# Patient Record
Sex: MALE | Race: WHITE
[De-identification: names, ages, dates, MRNs, and addresses within clinical notes are randomized per-mention and may not be internally consistent; named-entity substitution may affect disease eponyms.]

---

## 2018-05-29 ENCOUNTER — HOSPITAL ENCOUNTER (OUTPATIENT)
Dept: HOSPITAL 61 - CT | Age: 68
Discharge: HOME | End: 2018-05-29
Attending: UROLOGY
Payer: MEDICARE

## 2018-05-29 DIAGNOSIS — R59.0: ICD-10-CM

## 2018-05-29 DIAGNOSIS — M51.36: ICD-10-CM

## 2018-05-29 DIAGNOSIS — M48.061: ICD-10-CM

## 2018-05-29 DIAGNOSIS — D35.02: ICD-10-CM

## 2018-05-29 DIAGNOSIS — K43.9: ICD-10-CM

## 2018-05-29 DIAGNOSIS — N13.39: ICD-10-CM

## 2018-05-29 DIAGNOSIS — N28.1: Primary | ICD-10-CM

## 2018-05-29 DIAGNOSIS — K57.30: ICD-10-CM

## 2018-05-29 PROCEDURE — 74178 CT ABD&PLV WO CNTR FLWD CNTR: CPT

## 2018-05-29 RX ADMIN — IOHEXOL 1 ML: 300 INJECTION, SOLUTION INTRAVENOUS at 11:51

## 2018-06-19 ENCOUNTER — HOSPITAL ENCOUNTER (EMERGENCY)
Dept: HOSPITAL 63 - ER | Age: 68
Discharge: HOME | End: 2018-06-19
Payer: MEDICARE

## 2018-06-19 VITALS — DIASTOLIC BLOOD PRESSURE: 75 MMHG | SYSTOLIC BLOOD PRESSURE: 128 MMHG

## 2018-06-19 VITALS — BODY MASS INDEX: 25.69 KG/M2 | HEIGHT: 76 IN | WEIGHT: 211 LBS

## 2018-06-19 DIAGNOSIS — R31.0: ICD-10-CM

## 2018-06-19 DIAGNOSIS — M19.90: ICD-10-CM

## 2018-06-19 DIAGNOSIS — N39.0: Primary | ICD-10-CM

## 2018-06-19 DIAGNOSIS — Z90.49: ICD-10-CM

## 2018-06-19 DIAGNOSIS — E11.9: ICD-10-CM

## 2018-06-19 LAB
APTT PPP: (no result) S
BACTERIA #/AREA URNS HPF: (no result) /HPF
BILIRUB UR QL STRIP: (no result)
FIBRINOGEN PPP-MCNC: (no result) MG/DL
GLUCOSE UR STRIP-MCNC: (no result) MG/DL
NITRITE UR QL STRIP: (no result)
RBC #/AREA URNS HPF: >40 /HPF (ref 0–2)
SP GR UR STRIP: 1.02
SQUAMOUS #/AREA URNS LPF: (no result) /LPF
UROBILINOGEN UR-MCNC: 0.2 MG/DL
WBC #/AREA URNS HPF: (no result) /HPF (ref 0–4)

## 2018-06-19 PROCEDURE — 96372 THER/PROPH/DIAG INJ SC/IM: CPT

## 2018-06-19 PROCEDURE — 81001 URINALYSIS AUTO W/SCOPE: CPT

## 2018-06-19 PROCEDURE — 99285 EMERGENCY DEPT VISIT HI MDM: CPT

## 2018-06-19 NOTE — PHYS DOC
Past History


Past Medical History:  Arthritis, Cancer, Diabetes, Other


Past Surgical History:  Appendectomy, Cholecystectomy, Other


Alcohol Use:  None


Drug Use:  None





Adult General


Chief Complaint


Chief Complaint:  URINARY RETENTION





HPI


HPI





Patient is a 68-year-old male who presents for evaluation of hematuria and 

suprapubic pain. He states that he had a procedure performed yesterday by Dr. Mendenhall, a urologist, at [facility]. He had been having some gross 

hematuria so biopsies were taken of his bladder and a tumor was removed which 

is thought to be malignant but results are pending. He is able to urinate but 

has pain when doing so and has gross hematuria. He took some of his home 

opiates before coming in. He does not feel like he is retaining urine at this 

time.





Review of Systems


Review of Systems





Constitutional: Denies fever or chills []


Eyes: Denies change in visual acuity, redness, or eye pain []


HENT: Denies nasal congestion or sore throat []


Respiratory: Denies cough or shortness of breath []


Cardiovascular: No additional information not addressed in HPI []


GI: Denies abdominal pain, nausea, vomiting, bloody stools or diarrhea []


: +dysuria and hematuria


Musculoskeletal: Denies back pain or joint pain []


Integument: Denies rash or skin lesions []


Neurologic: Denies headache, focal weakness or sensory changes []


Endocrine: Denies polyuria or polydipsia []





All other systems were reviewed and found to be within normal limits, except as 

documented in this note.





Allergies


Allergies





Allergies








Coded Allergies Type Severity Reaction Last Updated Verified


 


  No Known Drug Allergies    5/13/16 No











Physical Exam


Physical Exam





Constitutional: Well developed, well nourished, no acute distress, non-toxic 

appearance. []


HENT: Normocephalic, atraumatic, bilateral external ears normal, oropharynx 

moist, no oral exudates, nose normal. []


Eyes: PERRLA, EOMI, conjunctiva normal, no discharge. [] 


Neck: Normal range of motion, no tenderness, supple, no stridor. [] 


Cardiovascular:Heart rate regular rhythm, no murmur []


Lungs & Thorax:  Bilateral breath sounds clear to auscultation []


Abdomen: Bowel sounds normal, soft, no masses, no pulsatile masses. [] Gaxiola in 

place, +gross hematuria, mild suprapubic discomfort


Skin: Warm, dry, no erythema, no rash. [] 


Back: No tenderness, no CVA tenderness. [] 


Extremities: No tenderness, no cyanosis, no clubbing, ROM intact, no edema. [] 


Neurologic: Alert and oriented X 3, normal motor function, normal sensory 

function, no focal deficits noted. []


Psychologic: Affect normal, judgement normal, mood normal. []





EKG


EKG


[]





Radiology/Procedures


Radiology/Procedures


[]





Course & Med Decision Making


Course & Med Decision Making


Pertinent Labs and Imaging studies reviewed. (See chart for details)





@1100 - Case discussed with Dr. Mendenhall, the patient's urologist. He states that 

the patient was having clear urine after the procedure and was sent home. He 

states that as long as he is not having clots causing obstruction he is okay 

with the patient going home. He states the pain is likely from a combination of 

the catheter and the biopsy sites and that he would prefer to keep the catheter 

in place at this time if the patient is able to tolerate it. He states the 

patient is going to see him in clinic on this Thursda (48 hrs). Will wait for 

UA results.





@1115 - UA shows mild UTI so will treat with Levaquin here and to go home with. 

Encourage patient to keep his appointment with his urologist on Thursday.





Dragon Disclaimer


Dragon Disclaimer


This electronic medical record was generated, in whole or in part, using a 

voice recognition dictation system.





Departure


Departure:


Impression:  


 Primary Impression:  


 Gross hematuria


 Additional Impressions:  


 Gaxiola catheter in place


 UTI (urinary tract infection)


Disposition:  01 HOME, SELF-CARE


Condition:  STABLE


Referrals:  


WEN HOLLOWAY MD (PCP)








NEGRA MENDENHALL MD


Patient Instructions:  Gaxiola Catheter Care, Adult, Hematuria, Adult, Urinary 

Tract Infection





Additional Instructions:  


Keep yourr appointment with Dr. Mendenhall on Thursday. Return to the emergency 

department for new or worsening symptoms. Take the antibiotic as prescribed. 

Drink plenty of water at home to avoid dehydration.


Scripts


Levofloxacin (LEVAQUIN) 750 Mg Tablet


1 TAB PO DAILY, #5 TAB


   Prov: TREVOR CROFT DO         6/19/18





Problem Qualifiers











TREVOR CROFT DO Jun 19, 2018 10:12

## 2019-01-21 ENCOUNTER — HOSPITAL ENCOUNTER (EMERGENCY)
Dept: HOSPITAL 63 - ER | Age: 69
Discharge: ANOTHER HOSPITAL | End: 2019-01-21
Payer: MEDICARE

## 2019-01-21 VITALS — BODY MASS INDEX: 26.04 KG/M2 | WEIGHT: 213.85 LBS | HEIGHT: 76 IN

## 2019-01-21 VITALS — SYSTOLIC BLOOD PRESSURE: 124 MMHG | DIASTOLIC BLOOD PRESSURE: 70 MMHG

## 2019-01-21 DIAGNOSIS — G89.29: Primary | ICD-10-CM

## 2019-01-21 DIAGNOSIS — Z85.51: ICD-10-CM

## 2019-01-21 DIAGNOSIS — Z85.05: ICD-10-CM
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
## 2019-01-21 DIAGNOSIS — Z85.46: ICD-10-CM
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
## 2019-01-21 DIAGNOSIS — R41.82: ICD-10-CM

## 2019-01-21 DIAGNOSIS — Z85.118: ICD-10-CM

## 2019-01-21 DIAGNOSIS — I25.10: ICD-10-CM

## 2019-01-21 DIAGNOSIS — M54.5: ICD-10-CM

## 2019-01-21 DIAGNOSIS — I11.9: ICD-10-CM

## 2019-01-21 DIAGNOSIS — Z90.89: ICD-10-CM

## 2019-01-21 DIAGNOSIS — J98.11: ICD-10-CM

## 2019-01-21 DIAGNOSIS — Z87.442: ICD-10-CM

## 2019-01-21 DIAGNOSIS — Z90.49: ICD-10-CM

## 2019-01-21 DIAGNOSIS — M19.90: ICD-10-CM

## 2019-01-21 DIAGNOSIS — N13.30: ICD-10-CM

## 2019-01-21 DIAGNOSIS — M25.551: ICD-10-CM
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
## 2019-01-21 DIAGNOSIS — R79.89: ICD-10-CM

## 2019-01-21 DIAGNOSIS — D64.9: ICD-10-CM

## 2019-01-21 DIAGNOSIS — E11.9: ICD-10-CM

## 2019-01-21 DIAGNOSIS — Z85.47: ICD-10-CM

## 2019-01-21 LAB
ALBUMIN SERPL-MCNC: 3.2 G/DL (ref 3.4–5)
ALP SERPL-CCNC: 102 U/L (ref 46–116)
ALT SERPL-CCNC: 21 U/L (ref 16–63)
AMPHETAMINE/METHAMPHETAMINE: (no result)
ANION GAP SERPL CALC-SCNC: 7 MMOL/L (ref 6–14)
APTT PPP: YELLOW S
AST SERPL-CCNC: 14 U/L (ref 15–37)
BACTERIA #/AREA URNS HPF: 0 /HPF
BARBITURATES UR-MCNC: (no result) UG/ML
BASOPHILS # BLD AUTO: 0.1 X10^3/UL (ref 0–0.2)
BASOPHILS NFR BLD: 1 % (ref 0–3)
BENZODIAZ UR-MCNC: (no result) UG/L
BILIRUB DIRECT SERPL-MCNC: 0.1 MG/DL (ref 0–0.2)
BILIRUB SERPL-MCNC: 0.3 MG/DL (ref 0.2–1)
BILIRUB UR QL STRIP: (no result)
CA-I SERPL ISE-MCNC: 28 MG/DL (ref 8–26)
CALCIUM SERPL-MCNC: 9.4 MG/DL (ref 8.5–10.1)
CANNABINOIDS UR-MCNC: (no result) UG/L
CHLORIDE SERPL-SCNC: 103 MMOL/L (ref 98–107)
CO2 SERPL-SCNC: 27 MMOL/L (ref 21–32)
COCAINE UR-MCNC: (no result) NG/ML
CREAT SERPL-MCNC: 1.4 MG/DL (ref 0.7–1.3)
EOSINOPHIL NFR BLD: 0.1 X10^3/UL (ref 0–0.7)
EOSINOPHIL NFR BLD: 1 % (ref 0–3)
ERYTHROCYTE [DISTWIDTH] IN BLOOD BY AUTOMATED COUNT: 14.8 % (ref 11.5–14.5)
FIBRINOGEN PPP-MCNC: CLEAR MG/DL
GFR SERPLBLD BASED ON 1.73 SQ M-ARVRAT: 50.4 ML/MIN
GLUCOSE SERPL-MCNC: 137 MG/DL (ref 70–99)
GLUCOSE UR STRIP-MCNC: (no result) MG/DL
HCT VFR BLD CALC: 27 % (ref 39–53)
HGB BLD-MCNC: 8.9 G/DL (ref 13–17.5)
LYMPHOCYTES # BLD: 0.6 X10^3/UL (ref 1–4.8)
LYMPHOCYTES NFR BLD AUTO: 8 % (ref 24–48)
MAGNESIUM SERPL-MCNC: 1.8 MG/DL (ref 1.8–2.4)
MCH RBC QN AUTO: 28 PG (ref 25–35)
MCHC RBC AUTO-ENTMCNC: 33 G/DL (ref 31–37)
MCV RBC AUTO: 85 FL (ref 79–100)
METHADONE SERPL-MCNC: (no result) NG/ML
MONO #: 0.8 X10^3/UL (ref 0–1.1)
MONOCYTES NFR BLD: 10 % (ref 0–9)
NEUT #: 6.3 X10^3UL (ref 1.8–7.7)
NEUTROPHILS NFR BLD AUTO: 81 % (ref 31–73)
NITRITE UR QL STRIP: (no result)
OPIATES UR-MCNC: (no result) NG/ML
PCP SERPL-MCNC: (no result) MG/DL
PLATELET # BLD AUTO: 196 X10^3/UL (ref 140–400)
POTASSIUM SERPL-SCNC: 4.6 MMOL/L (ref 3.5–5.1)
PROT SERPL-MCNC: 6.7 G/DL (ref 6.4–8.2)
RBC # BLD AUTO: 3.16 X10^6/UL (ref 4.3–5.7)
RBC #/AREA URNS HPF: 0 /HPF (ref 0–2)
SODIUM SERPL-SCNC: 137 MMOL/L (ref 136–145)
SP GR UR STRIP: 1.01
SQUAMOUS #/AREA URNS LPF: (no result) /LPF
UROBILINOGEN UR-MCNC: 0.2 MG/DL
WBC # BLD AUTO: 7.9 X10^3/UL (ref 4–11)
WBC #/AREA URNS HPF: (no result) /HPF (ref 0–4)

## 2019-01-21 PROCEDURE — 80307 DRUG TEST PRSMV CHEM ANLYZR: CPT

## 2019-01-21 PROCEDURE — 85730 THROMBOPLASTIN TIME PARTIAL: CPT

## 2019-01-21 PROCEDURE — 84484 ASSAY OF TROPONIN QUANT: CPT

## 2019-01-21 PROCEDURE — 73552 X-RAY EXAM OF FEMUR 2/>: CPT

## 2019-01-21 PROCEDURE — 96374 THER/PROPH/DIAG INJ IV PUSH: CPT

## 2019-01-21 PROCEDURE — 72125 CT NECK SPINE W/O DYE: CPT

## 2019-01-21 PROCEDURE — 99285 EMERGENCY DEPT VISIT HI MDM: CPT

## 2019-01-21 PROCEDURE — 72170 X-RAY EXAM OF PELVIS: CPT

## 2019-01-21 PROCEDURE — 93005 ELECTROCARDIOGRAM TRACING: CPT

## 2019-01-21 PROCEDURE — 72131 CT LUMBAR SPINE W/O DYE: CPT

## 2019-01-21 PROCEDURE — 83880 ASSAY OF NATRIURETIC PEPTIDE: CPT

## 2019-01-21 PROCEDURE — 84443 ASSAY THYROID STIM HORMONE: CPT

## 2019-01-21 PROCEDURE — 36415 COLL VENOUS BLD VENIPUNCTURE: CPT

## 2019-01-21 PROCEDURE — 80048 BASIC METABOLIC PNL TOTAL CA: CPT

## 2019-01-21 PROCEDURE — 82550 ASSAY OF CK (CPK): CPT

## 2019-01-21 PROCEDURE — 72192 CT PELVIS W/O DYE: CPT

## 2019-01-21 PROCEDURE — 85025 COMPLETE CBC W/AUTO DIFF WBC: CPT

## 2019-01-21 PROCEDURE — 71045 X-RAY EXAM CHEST 1 VIEW: CPT

## 2019-01-21 PROCEDURE — 70450 CT HEAD/BRAIN W/O DYE: CPT

## 2019-01-21 PROCEDURE — 81001 URINALYSIS AUTO W/SCOPE: CPT

## 2019-01-21 PROCEDURE — 85379 FIBRIN DEGRADATION QUANT: CPT

## 2019-01-21 PROCEDURE — 85610 PROTHROMBIN TIME: CPT

## 2019-01-21 PROCEDURE — 80076 HEPATIC FUNCTION PANEL: CPT

## 2019-01-21 PROCEDURE — 83735 ASSAY OF MAGNESIUM: CPT

## 2019-01-21 NOTE — RAD
Examination: CT head, cervical spine, lumbar spine, pelvis

 

HISTORY: Fall, weakness, history of bladder cancer, metastasis

 

TECHNIQUE: Axial CT images of the head were performed without contrast. 

Axial CT images of the cervical spine was performed without contrast. 

Axial CT images of the lumbar spine and pelvis were performed without 

contrast. Coronal and sagittal reformats are performed.

 

Exposure: One or more of the following individualized dose reduction 

techniques were utilized for this examination:  1. Automated exposure 

control  2. Adjustment of the mA and/or kV according to patient size  3. 

Use of iterative reconstruction technique 

 

Comparison: None available

 

FINDINGS:

 

There is 3 mm left-to-right midline shift. There are multiple 

hypodensities identified in the right and left cerebral hemispheres and 

possibly the right cerebellum with the largest measuring 7.6 cm in the 

left frontal lobe white matter region could be edema or metastasis with 

surrounding edema. There is no acute intracranial bleed or extra-axial 

fluid collection identified.

 

Small mucous retention cyst or polyp identified in the left maxillary 

sinus.

 

Vertebral body height are maintained.  Cervical lordosis is preserved.  

The lateral masses of C1 are aligned upon C2.  

 

No fractures identified.  The bony canal is patent throughout.

 

There is a 9 mm hypodensity identified in the C6 vertebral body abutting 

the inferior endplate. Moderate intervertebral disc height loss identified

throughout cervical spine. Minimal 2 mm anterolisthesis of C3 on C4.  

 

 The lumbar vertebral body heights are maintained. Severe intervertebral 

disc height loss identified at L3-L4, L4-L5 vertebral levels with 

sclerotic changes identified in the endplates of L3-L4, L4-L5 vertebral 

levels likely to severe degenerative changes.

 

Partially visualized severe right-sided hydronephrosis and hydroureter 

with surrounding fat stranding identified

 

 

 

The bilateral femoral heads within the acetabula. Mild joint space loss 

identified in the bilateral hip joints. No obvious acute fracture 

visualized

 

 

 

IMPRESSION:

 

1. Multiple hypodensities with probable surrounding edema identified in 

the bilateral cerebral hemispheres particularly in the left frontal lobe, 

right occipital lobe and possibly right cerebellum. Metastasis is a 

possibility. Recommend MRI brain for further evaluation.

 

 

2. Mild 3 mm left-to-right midline shift.

 

2. Severe degenerative changes cervical spine and lumbar spine. There is a

9 mm lytic hypodensity identified in the C6 vertebral body nonspecific 

could be metastasis is not excluded. MRI cervical spine is recommended.

 

4. Sclerotic densities identified in the lumbar spine particularly in the 

endplate of L3-L4, L4-L5 vertebral levels probably due to severe 

degeneration however metastasis is not completely excluded. MRI lumbar 

spine is recommended.

 

5. Severe partially visualized right-sided hydronephrosis and hydroureter.

 

Head CT findings were given to Dr. Montenegro in the ER at 6:20 AM same day 

exam.

 

 

 

Electronically signed by: Ruddy Montano MD (1/21/2019 6:37 AM) [id number]

## 2019-01-21 NOTE — ED.ADGEN
Past History


Past Medical History:  Anemia, Arthritis, CAD, Cancer, Diabetes, Heart Disease, 

Hypertension, Kidney Stones, Other


Past Surgical History:  Appendectomy, Cholecystectomy, Other


Alcohol Use:  None


Drug Use:  None





Adult General


Chief Complaint


Chief Complaint


".. I ve fallen twice tonight.. I was going to the bathroom.. maybe a little 

more pain in this Rt. hip... and low back.. but my wife said I had to come... 

in and get checked out..."





[facility]


HPI





Patient is a 68 year old male who presents with hx of falls.  Pt. has extensive 

medical hx. of testicular cancer stage IV with  bladder , prostate, liver, lung 

metastatic spread. Pt. has  chronic low back and rt. hip pain, DJD, HTN, CARDz, 

TIA, Rt. Hydronephrosis and deconditioning.     Pt. has fallen twice in last 24 

hrs. because of weakness.   Pt. had two Keytruda chemotherapy and last was two 

weeks ago.  Treatments are every 3 weeks.   Pt.  per wife has had increased 

weakness and fire department called to help get the pt. back up after the last 

two falls.  Pt. Follows with  Dr. Doyle Herring for his chemo  and cancer 

therapy . Pt. follows at [place] for primary care.  Pt. wife states he seems to 

been obviously more generalized weakness, confusion last 3 days and he seemed 

to have more Rt side  weakness since Sat.01/19.. .   Pt. has been compliant 

with Xarelto 20 mg day. Pt. compliant with meds as per home list.





Review of Systems


Review of Systems





Constitutional: Denies fever or chills []


Eyes: Denies change in visual acuity, redness, or eye pain []


HENT: Denies nasal congestion or sore throat []


Respiratory: Denies cough or shortness of breath []


Cardiovascular: No additional information not addressed in HPI []


GI: Denies abdominal pain, nausea, vomiting, bloody stools or diarrhea []


: Denies dysuria or hematuria []


Musculoskeletal: complaints  back pain , Rt hip joint pain and generalized 

fatigue.


Integument: Denies rash or skin lesions []


Neurologic: Denies headache, focal weakness or sensory changes []


Endocrine: Denies polyuria or polydipsia []





All other systems were reviewed and found to be within normal limits, except as 

documented in this note.





Family History


Family History


Non-contributory





Current Medications


Current Medications





Current Medications








 Medications


  (Trade)  Dose


 Ordered  Sig/Raysa  Start Time


 Stop Time Status Last Admin


Dose Admin


 


 Dexamethasone


 Sodium Phosphate


  (Decadron)  10 mg  1X  ONCE  1/21/19 06:00


 1/21/19 06:01 DC 1/21/19 06:07


10 MG


 


 Lactated Ringer's  1,000 ml @ 


 100 mls/hr  Q10H  1/21/19 05:00


 1/21/19 14:59  1/21/19 06:07


100 MLS/HR


 


 Methylprednisolone


 Sodium Succinate


  (SOLU-Medrol


 125MG VIAL)  125 mg  1X  ONCE  1/21/19 06:00


 1/21/19 06:00 DC  





 


 Oxycodone/


 Acetaminophen


  (Percocet 5/325)  2 tab  1X  ONCE  1/21/19 07:30


 1/21/19 07:31 DC 1/21/19 07:23


2 TAB





See Nursing for home meds





Allergies


Allergies





Allergies








Coded Allergies Type Severity Reaction Last Updated Verified


 


  No Known Drug Allergies    5/13/16 No











Physical Exam


Physical Exam





Constitutional: Moderately acute distress, non-toxic appearance. []


HENT: Normocephalic, atraumatic, bilateral external ears normal, oropharynx 

moist, no oral exudates, nose normal. []


Eyes: PERRLA, EOMI, conjunctiva normal, no discharge. Glasses


Neck: Normal range of motion, no tenderness, supple, no stridor. [] 


Cardiovascular:Heart rate regular rhythm, no murmur []PMI to Lt. 


Lungs & Thorax:  Bilateral breath sounds equal at apexes on  auscultation []

Port on Rt.


Abdomen: Bowel sounds normal, soft, no tenderness, no masses, no pulsatile 

masses. Old surgical scars.  Radiation tattoo markers


Skin: Warm, dry, no erythema, no rash. Poor turgor


Back: Lumbar and sacral tenderness, no CVA tenderness. [] 


Extremities: Right hip tenderness, no cyanosis, no clubbing, ROM intact, no 

edema. [] 


Neurologic: Alert and oriented X 3, generalized motor weakness, decreased 

plantar sensory function,  DTR+ 2 patella.  Moves all ext. on request.   

equal. 


Psychologic: Affect anxious, flat, judgement normal, mood depressed.





Current Patient Data


Vital Signs





 Vital Signs








  Date Time  Temp Pulse Resp B/P (MAP) Pulse Ox O2 Delivery O2 Flow Rate FiO2


 


1/21/19 07:23   18  95 Room Air  


 


1/21/19 07:10  75  116/71 (86)    


 


1/21/19 04:48 97.7       








Lab Results





 Laboratory Tests








Test


 1/21/19


04:55 1/21/19


05:12


 


Urine Collection Type Unknown   


 


Urine Color Yellow   


 


Urine Clarity Clear   


 


Urine pH 7.0   


 


Urine Specific Gravity 1.015   


 


Urine Protein


 Neg


(NEG-TRACE) 





 


Urine Glucose (UA)


 Neg mg/dL


(NEG) 





 


Urine Ketones (Stick)


 Neg mg/dL


(NEG) 





 


Urine Blood Neg (NEG)   


 


Urine Nitrite Neg (NEG)   


 


Urine Bilirubin Neg (NEG)   


 


Urine Urobilinogen Dipstick


 0.2 mg/dL (0.2


mg/dL) 





 


Urine Leukocyte Esterase Neg (NEG)   


 


Urine RBC 0 /HPF (0-2)   


 


Urine WBC


 Occ /HPF (0-4)


 





 


Urine Squamous Epithelial


Cells Occ /LPF  


 





 


Urine Bacteria


 0 /HPF (0-FEW)


 





 


Urine Opiates Screen Neg (NEG)   


 


Urine Methadone Screen Neg (NEG)   


 


Urine Barbiturates Neg (NEG)   


 


Urine Phencyclidine Screen Neg (NEG)   


 


Urine


Amphetamine/Methamphetamine Neg (NEG)  


 





 


Urine Benzodiazepines Screen Neg (NEG)   


 


Urine Cocaine Screen Neg (NEG)   


 


Urine Cannabinoids Screen Neg (NEG)   


 


Urine Ethyl Alcohol Neg (NEG)   


 


White Blood Count


 


 7.9 x10^3/uL


(4.0-11.0)


 


Red Blood Count


 


 3.16 x10^6/uL


(4.30-5.70)  L


 


Hemoglobin


 


 8.9 g/dL


(13.0-17.5)  L


 


Hematocrit


 


 27.0 %


(39.0-53.0)  L


 


Mean Corpuscular Volume


 


 85 fL ()





 


Mean Corpuscular Hemoglobin  28 pg (25-35)  


 


Mean Corpuscular Hemoglobin


Concent 


 33 g/dL


(31-37)


 


Red Cell Distribution Width


 


 14.8 %


(11.5-14.5)  H


 


Platelet Count


 


 196 x10^3/uL


(140-400)


 


Neutrophils (%) (Auto)  81 % (31-73)  H


 


Lymphocytes (%) (Auto)  8 % (24-48)  L


 


Monocytes (%) (Auto)  10 % (0-9)  H


 


Eosinophils (%) (Auto)  1 % (0-3)  


 


Basophils (%) (Auto)  1 % (0-3)  


 


Neutrophils # (Auto)


 


 6.3 x10^3uL


(1.8-7.7)


 


Lymphocytes # (Auto)


 


 0.6 x10^3/uL


(1.0-4.8)  L


 


Monocytes # (Auto)


 


 0.8 x10^3/uL


(0.0-1.1)


 


Eosinophils # (Auto)


 


 0.1 x10^3/uL


(0.0-0.7)


 


Basophils # (Auto)


 


 0.1 x10^3/uL


(0.0-0.2)


 


Prothrombin Time


 


 12.0 SEC


(9.4-11.4)  H


 


Prothrombin Time INR


 


 1.2 (0.9-1.1)


H


 


PTT


 


 34 SEC (23-33)


H


 


D-Dimer (Daria)


 


 0.83 mg/L


(0.00-0.50)  H


 


Sodium Level


 


 137 mmol/L


(136-145)


 


Potassium Level


 


 4.6 mmol/L


(3.5-5.1)


 


Chloride Level


 


 103 mmol/L


()


 


Carbon Dioxide Level


 


 27 mmol/L


(21-32)


 


Anion Gap  7 (6-14)  


 


Blood Urea Nitrogen


 


 28 mg/dL


(8-26)  H


 


Creatinine


 


 1.4 mg/dL


(0.7-1.3)  H


 


Estimated GFR


(Cockcroft-Gault) 


 50.4  





 


Glucose Level


 


 137 mg/dL


(70-99)  H


 


Calcium Level


 


 9.4 mg/dL


(8.5-10.1)


 


Magnesium Level


 


 1.8 mg/dL


(1.8-2.4)


 


Total Bilirubin


 


 0.3 mg/dL


(0.2-1.0)


 


Direct Bilirubin


 


 0.1 mg/dL


(0.0-0.2)


 


Aspartate Amino Transferase


(AST) 


 14 U/L (15-37)


L


 


Alanine Aminotransferase (ALT)


 


 21 U/L (16-63)





 


Alkaline Phosphatase


 


 102 U/L


()


 


Creatine Kinase


 


 70 U/L


()


 


Troponin I Quantitative


 


 0.019 ng/mL


(0-0.055)


 


NT-Pro-B-Type Natriuretic


Peptide 


 419 pg/mL


(0-124)  H


 


Total Protein


 


 6.7 g/dL


(6.4-8.2)


 


Albumin


 


 3.2 g/dL


(3.4-5.0)  L











EKG


EKG


I interpretation of EKG shows a sinus rhythm at 79 bpm. Does have an occasional 

PVC. There is left axis. Some nonspecific anterior lateral changes. But no 

findings acute STEMI of contralateral changes.[]





Radiology/Procedures


Radiology/Procedures


My interpretation of CT Head shows what appears to be metastatic lesions.  Some

  mid line shift and edema Lt. frontal. See formal report when available. No 

obvious bleed.





My interpretation of CXR show s atelectasis, elevated Rt diaphragm  Port on Rt.

  DJD. Hilar adenopathy, 





My interpretation of femur  and pelvis shows no fx, djd.   CT of spine show 

severe DJD changes, does appear to have lytic lesion  in C6, and Rt. 

Hydronephrosis-  





See formal report when available.   


Radiology recommends head, cervical, thoracic and lumbar spine.  ( Suspect 

metastatic )





Course & Med Decision Making


Course & Med Decision Making


Pertinent Labs and Imaging studies reviewed. (See chart for details)





Pt. Hgb=  9.5  on 1/7/19


     Creat= 1.78


      Gluc=  257





Discussed options with pt. and wife.   They requested placement at [facility].  Do not 

want to go to . ( [facility] is closer to their home.)   Discussed with wife and pt. 

need to consider advance directives with findings on current CT head.   Dr. Bishop advised he would accept transfer to [facility], for the MRI evaluations and 

admissions.  Did notify on call for Dr. Laura Mcguire, reviewed labs and findings 

advised he would need to go through general admission if transfer to .   

Transfer to  offered again to pt. and family.  They again requested transfer 

to [facility]. 





[]





Final Impression


Final Impression


1. Falling and mental status changes


2. Acute on Chronic Rt Hip and Low back pain


3. Metastatic Testicular Cancer


4. Anemia  8.9


5. Elevated Creat 1.4  


6. DM  Gluc. 137


7. Suspect Metastatic Lesions- cause of CT changes





Dragon Disclaimer


Dragon Disclaimer


This electronic medical record was generated, in whole or in part, using a 

voice recognition dictation system.





Dragon Disclaimer


This chart was dictated in whole or in part using Voice Recognition software in 

a busy, high-work load, and often noisy Emergency Department environment.  It 

may contain unintended and wholly unrecognized errors or omissions.





Discharge Summary


Brief Hospital Course


Allergies





 Allergies








Coded Allergies Type Severity Reaction Last Updated Verified


 


  No Known Drug Allergies    5/13/16 No








Vital Signs





Vital Signs








  Date Time  Temp Pulse Resp B/P (MAP) Pulse Ox O2 Delivery O2 Flow Rate FiO2


 


1/21/19 07:23   18  95 Room Air  


 


1/21/19 07:10  75  116/71 (86)    


 


1/21/19 04:48 97.7       








Lab Results





Laboratory Tests








Test


 1/21/19


04:55 1/21/19


05:12


 


Urine Collection Type Unknown  


 


Urine Color Yellow  


 


Urine Clarity Clear  


 


Urine pH 7.0  


 


Urine Specific Gravity 1.015  


 


Urine Protein


 Neg


(NEG-TRACE) 





 


Urine Glucose (UA)


 Neg mg/dL


(NEG) 





 


Urine Ketones (Stick)


 Neg mg/dL


(NEG) 





 


Urine Blood Neg (NEG)  


 


Urine Nitrite Neg (NEG)  


 


Urine Bilirubin Neg (NEG)  


 


Urine Urobilinogen Dipstick


 0.2 mg/dL (0.2


mg/dL) 





 


Urine Leukocyte Esterase Neg (NEG)  


 


Urine RBC 0 /HPF (0-2)  


 


Urine WBC Occ /HPF (0-4)  


 


Urine Squamous Epithelial


Cells Occ /LPF 


 





 


Urine Bacteria 0 /HPF (0-FEW)  


 


Urine Opiates Screen Neg (NEG)  


 


Urine Methadone Screen Neg (NEG)  


 


Urine Barbiturates Neg (NEG)  


 


Urine Phencyclidine Screen Neg (NEG)  


 


Urine


Amphetamine/Methamphetamine Neg (NEG) 


 





 


Urine Benzodiazepines Screen Neg (NEG)  


 


Urine Cocaine Screen Neg (NEG)  


 


Urine Cannabinoids Screen Neg (NEG)  


 


Urine Ethyl Alcohol Neg (NEG)  


 


White Blood Count


 


 7.9 x10^3/uL


(4.0-11.0)


 


Red Blood Count


 


 3.16 x10^6/uL


(4.30-5.70)


 


Hemoglobin


 


 8.9 g/dL


(13.0-17.5)


 


Hematocrit


 


 27.0 %


(39.0-53.0)


 


Mean Corpuscular Volume  85 fL () 


 


Mean Corpuscular Hemoglobin  28 pg (25-35) 


 


Mean Corpuscular Hemoglobin


Concent 


 33 g/dL


(31-37)


 


Red Cell Distribution Width


 


 14.8 %


(11.5-14.5)


 


Platelet Count


 


 196 x10^3/uL


(140-400)


 


Neutrophils (%) (Auto)  81 % (31-73) 


 


Lymphocytes (%) (Auto)  8 % (24-48) 


 


Monocytes (%) (Auto)  10 % (0-9) 


 


Eosinophils (%) (Auto)  1 % (0-3) 


 


Basophils (%) (Auto)  1 % (0-3) 


 


Neutrophils # (Auto)


 


 6.3 x10^3uL


(1.8-7.7)


 


Lymphocytes # (Auto)


 


 0.6 x10^3/uL


(1.0-4.8)


 


Monocytes # (Auto)


 


 0.8 x10^3/uL


(0.0-1.1)


 


Eosinophils # (Auto)


 


 0.1 x10^3/uL


(0.0-0.7)


 


Basophils # (Auto)


 


 0.1 x10^3/uL


(0.0-0.2)


 


Prothrombin Time


 


 12.0 SEC


(9.4-11.4)


 


Prothromb Time International


Ratio 


 1.2 (0.9-1.1) 





 


Activated Partial


Thromboplast Time 


 34 SEC (23-33) 





 


D-Dimer (Daria)


 


 0.83 mg/L


(0.00-0.50)


 


Sodium Level


 


 137 mmol/L


(136-145)


 


Potassium Level


 


 4.6 mmol/L


(3.5-5.1)


 


Chloride Level


 


 103 mmol/L


()


 


Carbon Dioxide Level


 


 27 mmol/L


(21-32)


 


Anion Gap  7 (6-14) 


 


Blood Urea Nitrogen


 


 28 mg/dL


(8-26)


 


Creatinine


 


 1.4 mg/dL


(0.7-1.3)


 


Estimated GFR


(Cockcroft-Gault) 


 50.4 





 


Glucose Level


 


 137 mg/dL


(70-99)


 


Calcium Level


 


 9.4 mg/dL


(8.5-10.1)


 


Magnesium Level


 


 1.8 mg/dL


(1.8-2.4)


 


Total Bilirubin


 


 0.3 mg/dL


(0.2-1.0)


 


Direct Bilirubin


 


 0.1 mg/dL


(0.0-0.2)


 


Aspartate Amino Transf


(AST/SGOT) 


 14 U/L (15-37) 





 


Alanine Aminotransferase


(ALT/SGPT) 


 21 U/L (16-63) 





 


Alkaline Phosphatase


 


 102 U/L


()


 


Creatine Kinase


 


 70 U/L


()


 


Troponin I Quantitative


 


 0.019 ng/mL


(0-0.055)


 


NT-Pro-B-Type Natriuretic


Peptide 


 419 pg/mL


(0-124)


 


Total Protein


 


 6.7 g/dL


(6.4-8.2)


 


Albumin


 


 3.2 g/dL


(3.4-5.0)








Brief Hospital Course


Mr. Cummins  is a 68 old male who presented with mental status change and 

falling.  Found to have metastatic like lesion cerebellum and frontal lobe.  

Transfer to [facility] for MRI's





Discharge Information


Condition at Discharge:  Stable


Dischare Medications





Current Medications


Lactated Ringer's 1,000 ml @  100 mls/hr Q10H IV  Last administered on 1/21/ 19at 06:07; Admin Dose 100 MLS/HR;  Start 1/21/19 at 05:00;  Stop 1/21/19 at 14:

59


Methylprednisolone Sodium Succinate (SOLU-Medrol 125MG VIAL) 125 mg 1X  ONCE IV 

;  Start 1/21/19 at 06:00;  Stop 1/21/19 at 06:00;  Status DC


Dexamethasone Sodium Phosphate (Decadron) 10 mg 1X  ONCE IV  Last administered 

on 1/21/19at 06:07; Admin Dose 10 MG;  Start 1/21/19 at 06:00;  Stop 1/21/19 at 

06:01;  Status DC


Oxycodone/ Acetaminophen (Percocet 5/325) 2 tab 1X  ONCE PO  Last administered 

on 1/21/19at 07:23; Admin Dose 2 TAB;  Start 1/21/19 at 07:30;  Stop 1/21/19 at 

07:31;  Status DC





Active Scripts


Active


Levaquin (Levofloxacin) 750 Mg Tablet 1 Tab PO DAILY














WINSOME LAZAR MD Jan 21, 2019 04:56

## 2019-01-21 NOTE — RAD
Chest radiograph 1/21/2019 5:57 AM

 

INDICATION: Fall, chemotherapy port for bladder cancer.

 

COMPARISON: Acute abdominal series May 13, 2016

 

TECHNIQUE: Portable upright frontal view of the chest is provided.

 

FINDINGS:

 

The cardiomediastinal silhouette is within normal limits. Right chest wall

infusion port catheter is identified with the distal tip projecting over 

the cavoatrial junction. There is elevation the right hemidiaphragm.

 

There are no pleural effusions. There is no pulmonary vascular congestion.

Linear density in the right midlung may represent thickening of the minor 

fissure versus trace fluid within the minor fissure. Left lung is clear.

 

No significant osseous abnormalities visualized.

 

IMPRESSION:

 

Elevation the right hemidiaphragm with volume loss in the right lung and 

right basilar subsegmental atelectasis. There is pleural thickening along 

the minor fissure versus trace fluid within the minor fissure.

 

Electronically signed by: Marilee Hernandez MD (1/21/2019 7:50 AM) 

[facility]

## 2019-01-21 NOTE — EKG
[facility] [address], [place] [address]

Test Date:    2019               Test Time:    06:09:54

Pat Name:     MAKAYLA GONZALES           Department:   

Patient ID:   [id number]           Room:          

Gender:       M                        Technician:   KRISTOFER

:          1950               Requested By: WINSOME LAZAR

Order Number: [id number]            Reading MD:     

                                 Measurements

Intervals                              Axis          

Rate:         79                       P:            56

MT:           188                      QRS:          -10

QRSD:         84                       T:            35

QT:           376                                    

QTc:          437                                    

                           Interpretive Statements

SINUS RHYTHM

LEFTWARD AXIS

QRS(T) CONTOUR ABNORMALITY

CONSIDER ANTEROLATERAL MYOCARDIAL DAMAGE

POSSIBLY ABNORMAL ECG

RI6.01          Unconfirmed report

No previous ECG available for comparison

## 2019-01-21 NOTE — RAD
Examination: CT head, cervical spine, lumbar spine, pelvis

 

HISTORY: Fall, weakness, history of bladder cancer, metastasis

 

TECHNIQUE: Axial CT images of the head were performed without contrast. 

Axial CT images of the cervical spine was performed without contrast. 

Axial CT images of the lumbar spine and pelvis were performed without 

contrast. Coronal and sagittal reformats are performed.

 

Exposure: One or more of the following individualized dose reduction 

techniques were utilized for this examination:  1. Automated exposure 

control  2. Adjustment of the mA and/or kV according to patient size  3. 

Use of iterative reconstruction technique 

 

Comparison: None available

 

FINDINGS:

 

There is 3 mm left-to-right midline shift. There are multiple 

hypodensities identified in the right and left cerebral hemispheres and 

possibly the right cerebellum with the largest measuring 7.6 cm in the 

left frontal lobe white matter region could be edema or metastasis with 

surrounding edema. There is no acute intracranial bleed or extra-axial 

fluid collection identified.

 

Small mucous retention cyst or polyp identified in the left maxillary 

sinus.

 

Vertebral body height are maintained.  Cervical lordosis is preserved.  

The lateral masses of C1 are aligned upon C2.  

 

No fractures identified.  The bony canal is patent throughout.

 

There is a 9 mm hypodensity identified in the C6 vertebral body abutting 

the inferior endplate. Moderate intervertebral disc height loss identified

throughout cervical spine. Minimal 2 mm anterolisthesis of C3 on C4.  

 

 The lumbar vertebral body heights are maintained. Severe intervertebral 

disc height loss identified at L3-L4, L4-L5 vertebral levels with 

sclerotic changes identified in the endplates of L3-L4, L4-L5 vertebral 

levels likely to severe degenerative changes.

 

Partially visualized severe right-sided hydronephrosis and hydroureter 

with surrounding fat stranding identified

 

 

 

The bilateral femoral heads within the acetabula. Mild joint space loss 

identified in the bilateral hip joints. No obvious acute fracture 

visualized

 

 

 

IMPRESSION:

 

1. Multiple hypodensities with probable surrounding edema identified in 

the bilateral cerebral hemispheres particularly in the left frontal lobe, 

right occipital lobe and possibly right cerebellum. Metastasis is a 

possibility. Recommend MRI brain for further evaluation.

 

 

2. Mild 3 mm left-to-right midline shift.

 

2. Severe degenerative changes cervical spine and lumbar spine. There is a

9 mm lytic hypodensity identified in the C6 vertebral body nonspecific 

could be metastasis is not excluded. MRI cervical spine is recommended.

 

4. Sclerotic densities identified in the lumbar spine particularly in the 

endplate of L3-L4, L4-L5 vertebral levels probably due to severe 

degeneration however metastasis is not completely excluded. MRI lumbar 

spine is recommended.

 

5. Severe partially visualized right-sided hydronephrosis and hydroureter.

 

Head CT findings were given to Dr. Montenegro in the ER at 6:20 AM same day 

exam.

 

 

 

Electronically signed by: Ruddy Montano MD (1/21/2019 6:37 AM) [facility]

## 2019-01-21 NOTE — RAD
Examination: CT head, cervical spine, lumbar spine, pelvis

 

HISTORY: Fall, weakness, history of bladder cancer, metastasis

 

TECHNIQUE: Axial CT images of the head were performed without contrast. 

Axial CT images of the cervical spine was performed without contrast. 

Axial CT images of the lumbar spine and pelvis were performed without 

contrast. Coronal and sagittal reformats are performed.

 

Exposure: One or more of the following individualized dose reduction 

techniques were utilized for this examination:  1. Automated exposure 

control  2. Adjustment of the mA and/or kV according to patient size  3. 

Use of iterative reconstruction technique 

 

Comparison: None available

 

FINDINGS:

 

There is 3 mm left-to-right midline shift. There are multiple 

hypodensities identified in the right and left cerebral hemispheres and 

possibly the right cerebellum with the largest measuring 7.6 cm in the 

left frontal lobe white matter region could be edema or metastasis with 

surrounding edema. There is no acute intracranial bleed or extra-axial 

fluid collection identified.

 

Small mucous retention cyst or polyp identified in the left maxillary 

sinus.

 

Vertebral body height are maintained.  Cervical lordosis is preserved.  

The lateral masses of C1 are aligned upon C2.  

 

No fractures identified.  The bony canal is patent throughout.

 

There is a 9 mm hypodensity identified in the C6 vertebral body abutting 

the inferior endplate. Moderate intervertebral disc height loss identified

throughout cervical spine. Minimal 2 mm anterolisthesis of C3 on C4.  

 

 The lumbar vertebral body heights are maintained. Severe intervertebral 

disc height loss identified at L3-L4, L4-L5 vertebral levels with 

sclerotic changes identified in the endplates of L3-L4, L4-L5 vertebral 

levels likely to severe degenerative changes.

 

Partially visualized severe right-sided hydronephrosis and hydroureter 

with surrounding fat stranding identified

 

 

 

The bilateral femoral heads within the acetabula. Mild joint space loss 

identified in the bilateral hip joints. No obvious acute fracture 

visualized

 

 

 

IMPRESSION:

 

1. Multiple hypodensities with probable surrounding edema identified in 

the bilateral cerebral hemispheres particularly in the left frontal lobe, 

right occipital lobe and possibly right cerebellum. Metastasis is a 

possibility. Recommend MRI brain for further evaluation.

 

 

2. Mild 3 mm left-to-right midline shift.

 

2. Severe degenerative changes cervical spine and lumbar spine. There is a

9 mm lytic hypodensity identified in the C6 vertebral body nonspecific 

could be metastasis is not excluded. MRI cervical spine is recommended.

 

4. Sclerotic densities identified in the lumbar spine particularly in the 

endplate of L3-L4, L4-L5 vertebral levels probably due to severe 

degeneration however metastasis is not completely excluded. MRI lumbar 

spine is recommended.

 

5. Severe partially visualized right-sided hydronephrosis and hydroureter.

 

Head CT findings were given to Dr. Montenegro in the ER at 6:20 AM same day 

exam.

 

 

 

Electronically signed by: Ruddy Montano MD (1/21/2019 6:37 AM) [facility]

## 2019-01-21 NOTE — RAD
Pelvis radiograph 1/21/2019

Right femur radiograph

 

INDICATION: Fall with right hip pain.

 

COMPARISON: CT pelvis January 21, 2019

 

TECHNIQUE: AP view the pelvis and 5 views of the right femur are provided.

 

FINDINGS:

 

Moderate disc height loss is identified at L4-L5 with endplate sclerosis 

and marginal osteophytosis. Sacroiliac joints are well aligned. Sacral 

joanie appear intact. Fecal contents limit evaluation of the distal sacrum. 

Coccyx appears intact. Superior and inferior pubic rami are intact. 

Proximal femora are intact. The right femur is intact. No acute fracture 

or dislocation is identified. Normal bone mineralization. Mild femoral 

acetabular osteoarthrosis.

 

IMPRESSION:

 

No acute fracture or dislocation involving the pelvis and right femur.

 

Electronically signed by: Marilee Hernandez MD (1/21/2019 7:54 AM) 

[facility]

## 2019-02-23 ENCOUNTER — HOSPITAL ENCOUNTER (EMERGENCY)
Dept: HOSPITAL 63 - ER | Age: 69
Discharge: HOME | End: 2019-02-23
Payer: MEDICARE

## 2019-02-23 VITALS — BODY MASS INDEX: 26.04 KG/M2 | WEIGHT: 213.85 LBS | HEIGHT: 76 IN

## 2019-02-23 VITALS — DIASTOLIC BLOOD PRESSURE: 101 MMHG | SYSTOLIC BLOOD PRESSURE: 162 MMHG

## 2019-02-23 DIAGNOSIS — G89.29: ICD-10-CM

## 2019-02-23 DIAGNOSIS — M54.5: Primary | ICD-10-CM

## 2019-02-23 DIAGNOSIS — Z86.2: ICD-10-CM

## 2019-02-23 DIAGNOSIS — I25.10: ICD-10-CM

## 2019-02-23 DIAGNOSIS — I11.9: ICD-10-CM

## 2019-02-23 DIAGNOSIS — Z90.89: ICD-10-CM

## 2019-02-23 DIAGNOSIS — Z87.442: ICD-10-CM

## 2019-02-23 DIAGNOSIS — E11.9: ICD-10-CM

## 2019-02-23 DIAGNOSIS — M19.90: ICD-10-CM

## 2019-02-23 DIAGNOSIS — Z90.49: ICD-10-CM

## 2019-02-23 PROCEDURE — 99283 EMERGENCY DEPT VISIT LOW MDM: CPT

## 2019-02-23 PROCEDURE — 96372 THER/PROPH/DIAG INJ SC/IM: CPT

## 2019-02-23 PROCEDURE — 72100 X-RAY EXAM L-S SPINE 2/3 VWS: CPT

## 2019-02-23 NOTE — RAD
Indication: Lower back pain.

 

TECHNIQUE: 3 views of the lumbar spine

 

COMPARISON: None

 

FINDINGS:

There are 5 lumbar type vertebral bodies. No compression deformities. 

Multilevel intervertebral disc space narrowing is seen with osteophyte 

formation. Lower lumbar spine facet arthropathy.

 

IMPRESSION:

Advanced multilevel degenerative disease with lower lumbar spine facet 

arthropathy.

 

Electronically signed by: Jensen Fletcher DO (2/23/2019 2:25 PM) [facility]

## 2024-12-08 NOTE — RAD
Pelvis radiograph 1/21/2019

Right femur radiograph

 

INDICATION: Fall with right hip pain.

 

COMPARISON: CT pelvis January 21, 2019

 

TECHNIQUE: AP view the pelvis and 5 views of the right femur are provided.

 

FINDINGS:

 

Moderate disc height loss is identified at L4-L5 with endplate sclerosis 

and marginal osteophytosis. Sacroiliac joints are well aligned. Sacral 

joanie appear intact. Fecal contents limit evaluation of the distal sacrum. 

Coccyx appears intact. Superior and inferior pubic rami are intact. 

Proximal femora are intact. The right femur is intact. No acute fracture 

or dislocation is identified. Normal bone mineralization. Mild femoral 

acetabular osteoarthrosis.

 

IMPRESSION:

 

No acute fracture or dislocation involving the pelvis and right femur.

 

Electronically signed by: Marilee Hernandez MD (1/21/2019 7:54 AM) 

[facility]